# Patient Record
Sex: MALE | Race: WHITE | NOT HISPANIC OR LATINO | Employment: OTHER | URBAN - METROPOLITAN AREA
[De-identification: names, ages, dates, MRNs, and addresses within clinical notes are randomized per-mention and may not be internally consistent; named-entity substitution may affect disease eponyms.]

---

## 2024-04-05 ENCOUNTER — OFFICE VISIT (OUTPATIENT)
Dept: SLEEP MEDICINE | Facility: HOSPITAL | Age: 69
End: 2024-04-05
Payer: MEDICARE

## 2024-04-05 ENCOUNTER — TELEPHONE (OUTPATIENT)
Dept: SLEEP MEDICINE | Facility: HOSPITAL | Age: 69
End: 2024-04-05
Payer: MEDICARE

## 2024-04-05 VITALS — OXYGEN SATURATION: 93 % | BODY MASS INDEX: 32.62 KG/M2 | HEIGHT: 71 IN | WEIGHT: 233 LBS | HEART RATE: 70 BPM

## 2024-04-05 DIAGNOSIS — G47.8 NON-RESTORATIVE SLEEP: ICD-10-CM

## 2024-04-05 DIAGNOSIS — G47.33 OBSTRUCTIVE SLEEP APNEA: Primary | ICD-10-CM

## 2024-04-05 DIAGNOSIS — E66.9 OBESITY (BMI 30-39.9): ICD-10-CM

## 2024-04-05 DIAGNOSIS — G47.10 HYPERSOMNIA: ICD-10-CM

## 2024-04-05 DIAGNOSIS — Z78.9 INTOLERANCE OF CONTINUOUS POSITIVE AIRWAY PRESSURE (CPAP) VENTILATION: ICD-10-CM

## 2024-04-05 DIAGNOSIS — R06.83 SNORING: ICD-10-CM

## 2024-04-05 PROCEDURE — G0463 HOSPITAL OUTPT CLINIC VISIT: HCPCS

## 2024-04-05 PROCEDURE — 1160F RVW MEDS BY RX/DR IN RCRD: CPT | Performed by: FAMILY MEDICINE

## 2024-04-05 PROCEDURE — 99204 OFFICE O/P NEW MOD 45 MIN: CPT | Performed by: FAMILY MEDICINE

## 2024-04-05 PROCEDURE — 1159F MED LIST DOCD IN RCRD: CPT | Performed by: FAMILY MEDICINE

## 2024-04-05 NOTE — PROGRESS NOTES
[facility] New Patient/Consultation       Reason for Consultation: yris      Patient Care Team:  Brayan Mariee MD as PCP - General (Family Medicine)  Rudi Oswald MD as Consulting Physician (Sleep Medicine)      History of present illness:  Thank you for asking me to see your patient.  The patient is a 69 y.o. male presents to reestablish care for YRIS.  Reviewed copy of sleep study from 2019 which showed AHI 20.1 events per hour lowest SpO2 83%.  Patient is on auto CPAP 5-20 cm H2O.  We have data from PAP machine from data range 3/6/2024 - 4/4/2024 average usage 9 hours 9 minutes average pressure 9.9 cm H2O average AHI 1.9 events per hour.  Sleeps around 6 hours sleep latency about 1 hour reports hypersomnia 0 naps no tonsillectomy.  Patient reports hypersomnia nonrestorative sleep weight gain over the past 5 years snoring witnessed apneas waking up gasping for breath morning headaches waking with dry mouth or sensations nocturia to 2 times a night.  BMI 32.5.  Presents today due to concern for intolerance to CPAP and interested in inspire therapy.  Pressure feels intolerable and difficulty with tolerance to mask; sleep is worse since starting PAP machine.    Medical Conditions (PMH):   Anxiety  Depression  Hypertension  Restless legs  History of head injury  Arthritis    Social history:  Do you drive a commercial vehicle:  No   Shift work:  No   Tobacco use:  No   Alcohol use: 0 per week  Caffeinated drinks: 1 per per day    Family History (parents and siblings) (pertaining to sleep medicine):  Dementia    Allergies:  Bupropion, Venlafaxine, and Vilazodone hcl       Current Outpatient Medications:     Acetaminophen (Tylenol) 325 MG capsule, Take  by mouth., Disp: , Rfl:     escitalopram (LEXAPRO) 20 MG tablet, Take 20 mg by mouth Daily., Disp: , Rfl:     etodolac (LODINE) 400 MG tablet, , Disp: , Rfl:     gabapentin (NEURONTIN) 100 MG capsule, , Disp: , Rfl:     lisinopril (PRINIVIL,ZESTRIL) 10  "MG tablet, Take 10 mg by mouth Daily., Disp: , Rfl:     simvastatin (ZOCOR) 20 MG tablet, , Disp: , Rfl:     Vital Signs:    Vitals:    04/05/24 0728   Pulse: 70   SpO2: 93%   Weight: 106 kg (233 lb)   Height: 180.3 cm (71\")      Body mass index is 32.5 kg/m².  Neck Circumference: 18.5 inches      REVIEW OF SYSTEMS:  Pertinent positive symptoms are:  Snoring  Witnessed apnea  [place] Sleepiness Scale of Total score: 14   Fatigue  TMJ pain      Physical exam:  Vitals:    04/05/24 0728   Pulse: 70   SpO2: 93%   Weight: 106 kg (233 lb)   Height: 180.3 cm (71\")    Body mass index is 32.5 kg/m². Neck Circumference: 18.5 inches  HEENT: Head is atraumatic, normocephalic  Eyes: pupils are round equal and reacting to light and accommodation, conjunctiva normal  Throat: tongue normal  NECK:Neck Circumference: 18.5 inches  RESPIRATORY SYSTEM: Regular respirations  CARDIOVASULAR SYSTEM: Regular rate  EXTREMITES: No cyanosis, clubbing  NEUROLOGICAL SYSTEM: Oriented x 3, no gross motor defects, gait normal      Impression:  1. Obstructive sleep apnea    2. Hypersomnia    3. Non-restorative sleep    4. Obesity (BMI 30-39.9)    5. Intolerance of continuous positive airway pressure (CPAP) ventilation    6. Snoring        Plan:    Office note(s) from care team reviewed. Office note(s) reviewed: 2019 sleep study; 3/15/2019 note with sleep physician    Labs/ Test Results Reviewed:      N/A          ASSESSMENT AND PLAN:   Obstructive sleep apnea intolerant to CPAP interested in inspire therapy: Patient had obstructive sleep apnea and is intolerant to CPAP.  I discussed the signs, symptoms, and pathophysiology of sleep apnea with this patient.  I also discussed the potential complications of untreated sleep apnea including but not limited to resistant hypertension, insulin resistance, pulmonary hypertension, atrial fibrillation, heart attack, stroke, nonrestorative sleep with hypersomnia which can increase risk for motor vehicle " accidents, etc.   Different testing methods including home-based and lab based sleep studies were discussed with this patient.   Based on patient history and physical examination, will proceed with HST to reassess severity of sleep apnea and see if AHI is  to qualify for inspire therapy.  The order for the sleep study is placed in [facility].  The test will be scheduled after prior authorization has been obtained through patient's insurance.  Treatment and management will be discussed in more detail with this patient after the test is completed.  All questions were answered to patient's satisfaction.   Snoring: snoring is the sound created by turbulent airflow vibrating upper airway soft tissue.  I have also discussed factors affecting snoring including sleep deprivation, sleeping on the back and alcohol ingestion. To minimize snoring, patient is advised to have adequate sleep, sleep on their side, and avoid alcohol and sedative medications around bedtime.   Excessive daytime sleepiness:  [place] Sleepiness Scale of Total score: 14.  There are many causes of excessive daytime sleepiness.  Rule out sleep apnea as a contributing factor, as above.  Do not drive, operate heavy machinery, or do activities that require high concentration if feeling tired/drowsy.  Obesity: Body mass index is 32.5 kg/m².. Patients who are overweight or obese are at increased risk of sleep apnea/ sleep disordered breathing. Weight reduction and healthy lifestyle are encouraged in overweight/ obese patients as part of a comprehensive approach to sleep apnea treatment.    If AHI , refer to ENT for DISE to further consider inspire therapy.  In the meantime we will change pressure to set pressure of 8 to see if this helps with tolerance at all.    I have also discussed with the patient the following  Sleep hygiene: try to maintain a regular bed time and wake time, avoid watching TV/ using electronic devices in bed (including cell phones),  limit caffeinated and alcoholic beverages before bed, try to maintain a cool and quiet sleep environment, avoid daytime naps  Adequate amount of sleep: most people need around 7 to 8 hours of sleep each night      Patient's questions were answered.      Thank you for allowing me to participate in your patient's care.    Rudi Oswald MD  Sleep Medicine  04/05/24  09:26 EDT

## 2024-04-16 ENCOUNTER — HOSPITAL ENCOUNTER (OUTPATIENT)
Dept: SLEEP MEDICINE | Facility: HOSPITAL | Age: 69
End: 2024-04-16
Payer: MEDICARE

## 2024-04-16 DIAGNOSIS — G47.8 NON-RESTORATIVE SLEEP: ICD-10-CM

## 2024-04-16 DIAGNOSIS — R06.83 SNORING: ICD-10-CM

## 2024-04-16 DIAGNOSIS — E66.9 OBESITY (BMI 30-39.9): ICD-10-CM

## 2024-04-16 DIAGNOSIS — Z78.9 INTOLERANCE OF CONTINUOUS POSITIVE AIRWAY PRESSURE (CPAP) VENTILATION: ICD-10-CM

## 2024-04-16 DIAGNOSIS — G47.10 HYPERSOMNIA: ICD-10-CM

## 2024-04-16 DIAGNOSIS — G47.33 OBSTRUCTIVE SLEEP APNEA: ICD-10-CM

## 2024-04-16 PROCEDURE — 95806 SLEEP STUDY UNATT&RESP EFFT: CPT

## 2024-04-19 ENCOUNTER — TELEPHONE (OUTPATIENT)
Dept: SLEEP MEDICINE | Facility: HOSPITAL | Age: 69
End: 2024-04-19
Payer: MEDICARE

## 2024-04-19 PROCEDURE — 95806 SLEEP STUDY UNATT&RESP EFFT: CPT | Performed by: FAMILY MEDICINE

## 2024-04-22 ENCOUNTER — TELEPHONE (OUTPATIENT)
Dept: SLEEP MEDICINE | Facility: HOSPITAL | Age: 69
End: 2024-04-22
Payer: MEDICARE

## 2024-11-13 DIAGNOSIS — T88.8XXA MALFUNCTION OF CONTINUOUS POSITIVE AIRWAY PRESSURE (CPAP) OR BILEVEL POSITIVE AIRWAY PRESSURE (BPAP) MACHINE, INITIAL ENCOUNTER: ICD-10-CM

## 2024-11-13 DIAGNOSIS — G47.33 OBSTRUCTIVE SLEEP APNEA: Primary | ICD-10-CM

## 2025-01-22 ENCOUNTER — OFFICE VISIT (OUTPATIENT)
Dept: SLEEP MEDICINE | Facility: HOSPITAL | Age: 70
End: 2025-01-22
Payer: MEDICARE

## 2025-01-22 VITALS — HEIGHT: 71 IN | OXYGEN SATURATION: 94 % | WEIGHT: 227 LBS | HEART RATE: 84 BPM | BODY MASS INDEX: 31.78 KG/M2

## 2025-01-22 DIAGNOSIS — E66.9 OBESITY (BMI 30-39.9): ICD-10-CM

## 2025-01-22 DIAGNOSIS — G47.33 OBSTRUCTIVE SLEEP APNEA: Primary | ICD-10-CM

## 2025-01-22 PROCEDURE — 99213 OFFICE O/P EST LOW 20 MIN: CPT | Performed by: FAMILY MEDICINE

## 2025-01-22 PROCEDURE — G0463 HOSPITAL OUTPT CLINIC VISIT: HCPCS

## 2025-01-22 PROCEDURE — 1159F MED LIST DOCD IN RCRD: CPT | Performed by: FAMILY MEDICINE

## 2025-01-22 PROCEDURE — 1160F RVW MEDS BY RX/DR IN RCRD: CPT | Performed by: FAMILY MEDICINE

## 2025-01-22 NOTE — PROGRESS NOTES
"Follow Up [facility] Disorders [facility] Note     Chief Complaint:  GWEN     Primary Care Physician: Brayan Mariee MD    Interval History:   The patient is a 69 y.o. male  who was last seen in the sleep lab: 4/16/2024 for HST.  Patient intolerant to CPAP was interested in inspire therapy.  HST however showed AHI 12.6 with lowest SpO2 of 82%.  He did not qualify for inspire therapy.  Advised him to continue with CPAP.  Order was placed for new machine in November 2024.    Downloaded PAP Data Reviewed For Compliance:  DME is equipped.  Downloads between 11/20/2024 - 1/21/2025.  Average usage is 8 hours 30 minutes.  Average AHI is 2.3.  Average auto CPAP pressure is 11.8 cm H2O    I have reviewed the above results and compared them with the patient's last downloads and reviewed with the patient.    Review of Systems:    A complete review of systems was done and all were negative with the exception of all negative    Social History:    Social History     Socioeconomic History    Marital status:        Allergies:  Bupropion, Venlafaxine, and Vilazodone hcl     Medication Review:  Reviewed.      Vital Signs:    Vitals:    01/22/25 0728   Pulse: 84   SpO2: 94%   Weight: 103 kg (227 lb)   Height: 180.3 cm (71\")     Body mass index is 31.66 kg/m².    Physical Exam:    Constitutional:  Well developed 69 y.o. male that appears in no apparent distress.  Awake & oriented times 3.  Normal mood with normal recent and remote memory and normal judgement.  Eyes:  Conjunctivae normal.  Oropharynx: Previously, moist mucous membranes.    Self-administered [place] Sleepiness Scale test results: 2  0-5 Lower normal daytime sleepiness  6-10 Higher normal daytime sleepiness  11-12 Mild, 13-15 Moderate, & 16-24 Severe excessive daytime sleepiness    Impression:   1. Obstructive sleep apnea    2. Obesity (BMI 30-39.9)        Obstructive sleep apnea adequately treated with auto CPAP 8-16 cm H2O. The patient appears to be at goal with " good compliance and usage. The patient has no complaints of hypersomnolence.    Plan:  Good sleep hygiene measures should be maintained.  Weight loss would be beneficial in this patient who is obese by Body mass index is 31.66 kg/m²..      After evaluating the patient and assessing results available, the patient is benefiting from the treatment being provided.     The patient will continue PAP.  After clinical evaluation and review of downloads, I recommend no changes to the patient's pressures.  A new prescription will be sent to the patient's DME.    Caution during activities that require prolonged concentration is strongly advised if sleepiness returns. Changing of PAP supplies regularly is important for effective use. Patient needs to change cushion on the mask or plugs on nasal pillows along with disposable filters once every month and change mask frame, tubing, headgear and Velcro straps every 6 months at the minimum.    I answered all of the patient's questions.  The patient will call for any problems and will follow up in 1 year.      Rudi Oswald MD  Sleep Medicine  01/22/25  07:34 EST